# Patient Record
Sex: MALE | Race: WHITE | NOT HISPANIC OR LATINO | URBAN - METROPOLITAN AREA
[De-identification: names, ages, dates, MRNs, and addresses within clinical notes are randomized per-mention and may not be internally consistent; named-entity substitution may affect disease eponyms.]

---

## 2023-10-04 ENCOUNTER — WEB ENCOUNTER (OUTPATIENT)
Dept: URBAN - METROPOLITAN AREA CLINIC 31 | Facility: CLINIC | Age: 33
End: 2023-10-04

## 2023-10-10 ENCOUNTER — OFFICE VISIT (OUTPATIENT)
Dept: URBAN - METROPOLITAN AREA CLINIC 31 | Facility: CLINIC | Age: 33
End: 2023-10-10
Payer: COMMERCIAL

## 2023-10-10 ENCOUNTER — DASHBOARD ENCOUNTERS (OUTPATIENT)
Age: 33
End: 2023-10-10

## 2023-10-10 VITALS
DIASTOLIC BLOOD PRESSURE: 84 MMHG | HEIGHT: 73 IN | WEIGHT: 274 LBS | BODY MASS INDEX: 36.31 KG/M2 | HEART RATE: 96 BPM | SYSTOLIC BLOOD PRESSURE: 110 MMHG | OXYGEN SATURATION: 98 %

## 2023-10-10 DIAGNOSIS — K60.2 ANAL FISSURE: ICD-10-CM

## 2023-10-10 PROCEDURE — 99203 OFFICE O/P NEW LOW 30 MIN: CPT | Performed by: STUDENT IN AN ORGANIZED HEALTH CARE EDUCATION/TRAINING PROGRAM

## 2023-10-10 RX ORDER — MULTIVIT-MIN/IRON/FOLIC ACID/K 18-600-40
AS DIRECTED CAPSULE ORAL ONCE A DAY
Status: ACTIVE | COMMUNITY

## 2023-10-10 RX ORDER — CHOLECALCIFEROL (VITAMIN D3) 50 MCG
1 TABLET TABLET ORAL ONCE A DAY
Status: ACTIVE | COMMUNITY

## 2023-10-10 RX ORDER — NIFEDIPINE
PEA SIZE AMOUNT POWDER (GRAM) MISCELLANEOUS
Qty: 30 GRAMS | Refills: 1 | OUTPATIENT
Start: 2023-10-10 | End: 2023-12-08

## 2023-10-10 NOTE — HPI-TODAY'S VISIT:
33 year old male new patient presents today for a consultation for possible anal fissure. He reports in July, had BM, had glass sharp pain, small amount of blood at the time only with wiping and not every time. . Reports intermittent recurrence of symptoms. Reports symptoms generally self limited to times of BM with terms of pain. Reports will have no symptoms for a week, then another week symptoms daily symptoms. Taking benafiber capsules, drinks water. Reports metamucil made stool too large and worsened symptoms but was taking larger tablespoon size. He reports BM 1-2x per day, denies straining but stool can be hard. No family hx CRC.    No previous history of Colon/EGD No Imaging at this time for symptoms  Lab 09/22/2022 WBC 8.6; RBC 5.49; HGB 15.4 Creatinine 1.04; Bili, Tot 0.6; Alk, Phos 83; AST 25; ALT 44 vitamin D, 25-Hydroxy 15.8 (L)

## 2023-10-10 NOTE — EXAM-PHYSICAL EXAM
Gen: No acute distress Abdomen: Soft, non-distended, non-tender  Rectal: linear post fissure with scab, mild tender

## 2023-12-19 ENCOUNTER — OFFICE VISIT (OUTPATIENT)
Dept: URBAN - METROPOLITAN AREA CLINIC 31 | Facility: CLINIC | Age: 33
End: 2023-12-19

## 2023-12-19 ENCOUNTER — TELEPHONE ENCOUNTER (OUTPATIENT)
Dept: URBAN - METROPOLITAN AREA CLINIC 35 | Facility: CLINIC | Age: 33
End: 2023-12-19

## 2023-12-19 RX ORDER — CHOLECALCIFEROL (VITAMIN D3) 50 MCG
1 TABLET TABLET ORAL ONCE A DAY
Status: ACTIVE | COMMUNITY

## 2023-12-19 RX ORDER — MULTIVIT-MIN/IRON/FOLIC ACID/K 18-600-40
AS DIRECTED CAPSULE ORAL ONCE A DAY
Status: ACTIVE | COMMUNITY

## 2023-12-19 NOTE — HPI-TODAY'S VISIT:
33 year old male patient presents today for a follow up from last visit.  Last viait 10/10/2023 33 year old male new patient presents today for a consultation for possible anal fissure. He reports in July, had BM, had glass sharp pain, small amount of blood at the time only with wiping and not every time. . Reports intermittent recurrence of symptoms. Reports symptoms generally self limited to times of BM with terms of pain. Reports will have no symptoms for a week, then another week symptoms daily symptoms. Taking benafiber capsules, drinks water. Reports metamucil made stool too large and worsened symptoms but was taking larger tablespoon size. He reports BM 1-2x per day, denies straining but stool can be hard. No family hx CRC.    No previous history of Colon/EGD No Imaging at this time for symptoms  Lab 09/22/2022 WBC 8.6; RBC 5.49; HGB 15.4 Creatinine 1.04; Bili, Tot 0.6; Alk, Phos 83; AST 25; ALT 44 vitamin D, 25-Hydroxy 15.8 (L)

## 2024-01-09 ENCOUNTER — OFFICE VISIT (OUTPATIENT)
Dept: URBAN - METROPOLITAN AREA CLINIC 31 | Facility: CLINIC | Age: 34
End: 2024-01-09

## 2025-03-25 ENCOUNTER — OFFICE VISIT (OUTPATIENT)
Dept: URBAN - METROPOLITAN AREA CLINIC 31 | Facility: CLINIC | Age: 35
End: 2025-03-25
Payer: COMMERCIAL

## 2025-03-25 DIAGNOSIS — K60.2 ANAL FISSURE: ICD-10-CM

## 2025-03-25 DIAGNOSIS — K62.5 RECTAL BLEEDING: ICD-10-CM

## 2025-03-25 PROCEDURE — 99213 OFFICE O/P EST LOW 20 MIN: CPT | Performed by: STUDENT IN AN ORGANIZED HEALTH CARE EDUCATION/TRAINING PROGRAM

## 2025-03-25 RX ORDER — MULTIVIT-MIN/IRON/FOLIC ACID/K 18-600-40
AS DIRECTED CAPSULE ORAL ONCE A DAY
Status: ACTIVE | COMMUNITY

## 2025-03-25 RX ORDER — CHOLECALCIFEROL (VITAMIN D3) 50 MCG
1 TABLET TABLET ORAL ONCE A DAY
Status: ACTIVE | COMMUNITY

## 2025-03-25 RX ORDER — NIFEDIPINE
PEA SIZE AMOUNT POWDER (GRAM) MISCELLANEOUS TWICE A DAY
Qty: 30 GRAMS | Refills: 1 | OUTPATIENT
Start: 2025-03-25 | End: 2025-05-24

## 2025-03-25 NOTE — EXAM-PHYSICAL EXAM
Gen: No acute distress Abdomen: Soft, non-distended, non-tender Rectal: Female chaperone present, no external or prolapsing hemorrhoids.  Posterior small fissure with tenderness on exam.

## 2025-03-25 NOTE — HPI-TODAY'S VISIT:
33-year-old male presents for rectal bleeding and anorecatl pain.  He reports onset 5 days ago.  Reports had a hard bowel movement passage followed by sharp rectal pain with blood.  Subsequently since then he has been having glass type and sharp pain with blood with wiping and on top of stool.  Has bowel movement 1-2 times per day.  Denies excessive straining in general or constipation, except for episode last week where had passage of hard consistency stool.  Similar symptoms 2023 secondary to fissure that was treated and did well for over a year without symptoms of pain or bleeding. No otherwise symptoms weight loss, abdominal pain, family history of colon cancer.  No recent labs/imaging.  Last visit 10/10/2023 33 year old male new patient presents today for a consultation for possible anal fissure. He reports in July, had BM, had glass sharp pain, small amount of blood at the time only with wiping and not every time. . Reports intermittent recurrence of symptoms. Reports symptoms generally self limited to times of BM with terms of pain. Reports will have no symptoms for a week, then another week symptoms daily symptoms. Taking benafiber capsules, drinks water. Reports metamucil made stool too large and worsened symptoms but was taking larger tablespoon size. He reports BM 1-2x per day, denies straining but stool can be hard. No family hx CRC.    No previous history of Colon/EGD No Imaging at this time for symptoms  Lab 09/22/2022 WBC 8.6; RBC 5.49; HGB 15.4 Creatinine 1.04; Bili, Tot 0.6; Alk, Phos 83; AST 25; ALT 44 vitamin D, 25-Hydroxy 15.8 (L)

## 2025-06-17 ENCOUNTER — OFFICE VISIT (OUTPATIENT)
Dept: URBAN - METROPOLITAN AREA CLINIC 31 | Facility: CLINIC | Age: 35
End: 2025-06-17